# Patient Record
Sex: FEMALE | Race: WHITE | URBAN - METROPOLITAN AREA
[De-identification: names, ages, dates, MRNs, and addresses within clinical notes are randomized per-mention and may not be internally consistent; named-entity substitution may affect disease eponyms.]

---

## 2022-02-18 ENCOUNTER — OFFICE VISIT (OUTPATIENT)
Dept: URBAN - METROPOLITAN AREA CLINIC 98 | Facility: CLINIC | Age: 22
End: 2022-02-18

## 2022-03-21 ENCOUNTER — DASHBOARD ENCOUNTERS (OUTPATIENT)
Age: 22
End: 2022-03-21

## 2022-03-21 ENCOUNTER — WEB ENCOUNTER (OUTPATIENT)
Dept: URBAN - METROPOLITAN AREA CLINIC 98 | Facility: CLINIC | Age: 22
End: 2022-03-21

## 2022-03-21 ENCOUNTER — OFFICE VISIT (OUTPATIENT)
Dept: URBAN - METROPOLITAN AREA CLINIC 98 | Facility: CLINIC | Age: 22
End: 2022-03-21

## 2022-03-21 VITALS
HEART RATE: 51 BPM | DIASTOLIC BLOOD PRESSURE: 74 MMHG | BODY MASS INDEX: 21.43 KG/M2 | HEIGHT: 65 IN | TEMPERATURE: 97.2 F | WEIGHT: 128.6 LBS | SYSTOLIC BLOOD PRESSURE: 107 MMHG

## 2022-03-21 DIAGNOSIS — R14.0 ABDOMINAL BLOATING: ICD-10-CM

## 2022-03-21 DIAGNOSIS — K21.9 GERD WITHOUT ESOPHAGITIS: ICD-10-CM

## 2022-03-21 DIAGNOSIS — K59.04 CHRONIC IDIOPATHIC CONSTIPATION: ICD-10-CM

## 2022-03-21 PROBLEM — 82934008: Status: ACTIVE | Noted: 2022-03-21

## 2022-03-21 PROBLEM — 266435005: Status: ACTIVE | Noted: 2022-03-21

## 2022-03-21 PROCEDURE — 99204 OFFICE O/P NEW MOD 45 MIN: CPT | Performed by: INTERNAL MEDICINE

## 2022-03-21 RX ORDER — ESOMEPRAZOLE MAGNESIUM 40 MG/1
1 CAPSULE CAPSULE, DELAYED RELEASE ORAL ONCE A DAY
Qty: 30 | Refills: 1 | OUTPATIENT
Start: 2022-03-21

## 2022-03-21 NOTE — HPI-TODAY'S VISIT:
Ms. Roberts is a 20 yo F presenting for new patient visit for abdominal distension. For years she has had abdominal problems.   When she turned 19 she had an EGD and colonoscopy for abdominal pain and GERD and her BMs were irregular (could go several days without a BM). She said they were unable to complete the colonoscopy. She had some additional imaging after this that was normal.   She now feels bloating in her abdomen. Eating makes the bloating worse. She has a lot of gas pain. She is taking Motegrity (unsure of dose) and has been treated for SIBO in the past.  She has a BM every 3-4 days but Motegrity helps her to have a BM more frequently. Motegrity sometimes makes her have abdominal cramping.  Linzess and Amitiza did not work well for her.   She has seen blood once or twice in the last year or so on the toilet paper after she has had a large BM with straining.   She has acid regurgitation and belching most days of the week. She used to take Prilosec but it did not help her symptoms much. Tums helped somewhat in the past.   Her weight is stable.  No NSAID use.

## 2022-03-25 LAB
A/G RATIO: 1.9
ALBUMIN: 4.3
ALKALINE PHOSPHATASE: 56
ALT (SGPT): 13
AST (SGOT): 18
BASO (ABSOLUTE): 0
BASOS: 1
BILIRUBIN, TOTAL: 0.4
BUN/CREATININE RATIO: 10
BUN: 10
CALCIUM: 9.3
CARBON DIOXIDE, TOTAL: 21
CHLORIDE: 102
CREATININE: 1.04
EGFR: 79
EOS (ABSOLUTE): 0
EOS: 1
GLOBULIN, TOTAL: 2.3
GLUCOSE: 89
H PYLORI BREATH TEST: NEGATIVE
HEMATOCRIT: 37.5
HEMATOLOGY COMMENTS:: (no result)
HEMOGLOBIN: 11.8
IMMATURE CELLS: (no result)
IMMATURE GRANS (ABS): 0
IMMATURE GRANULOCYTES: 0
LYMPHS (ABSOLUTE): 1.7
LYMPHS: 40
MCH: 27
MCHC: 31.5
MCV: 86
MONOCYTES(ABSOLUTE): 0.4
MONOCYTES: 9
NEUTROPHILS (ABSOLUTE): 2.1
NEUTROPHILS: 49
NRBC: (no result)
PLATELETS: 265
POTASSIUM: 4.7
PROTEIN, TOTAL: 6.6
RBC: 4.37
RDW: 13.3
SODIUM: 138
WBC: 4.3

## 2022-03-29 ENCOUNTER — TELEPHONE ENCOUNTER (OUTPATIENT)
Dept: URBAN - METROPOLITAN AREA CLINIC 98 | Facility: CLINIC | Age: 22
End: 2022-03-29

## 2022-06-15 ENCOUNTER — ERX REFILL RESPONSE (OUTPATIENT)
Dept: URBAN - METROPOLITAN AREA CLINIC 98 | Facility: CLINIC | Age: 22
End: 2022-06-15

## 2022-06-15 RX ORDER — ESOMEPRAZOLE MAGNESIUM 40 MG/1
1 CAPSULE CAPSULE, DELAYED RELEASE ORAL ONCE A DAY
Qty: 30 | Refills: 1 | OUTPATIENT

## 2022-06-15 RX ORDER — ESOMEPRAZOLE MAGNESIUM 40 MG/1
TAKE 1 CAPSULE BY MOUTH EVERY DAY FOR 30 DAYS CAPSULE, DELAYED RELEASE ORAL
Qty: 30 CAPSULE | Refills: 1 | OUTPATIENT